# Patient Record
Sex: FEMALE | Race: WHITE | NOT HISPANIC OR LATINO | URBAN - METROPOLITAN AREA
[De-identification: names, ages, dates, MRNs, and addresses within clinical notes are randomized per-mention and may not be internally consistent; named-entity substitution may affect disease eponyms.]

---

## 2022-12-12 ENCOUNTER — TELEPHONE ENCOUNTER (OUTPATIENT)
Dept: URBAN - METROPOLITAN AREA CLINIC 40 | Facility: CLINIC | Age: 77
End: 2022-12-12

## 2022-12-14 ENCOUNTER — WEB ENCOUNTER (OUTPATIENT)
Dept: URBAN - METROPOLITAN AREA CLINIC 40 | Facility: CLINIC | Age: 77
End: 2022-12-14

## 2022-12-14 ENCOUNTER — OFFICE VISIT (OUTPATIENT)
Dept: URBAN - METROPOLITAN AREA CLINIC 40 | Facility: CLINIC | Age: 77
End: 2022-12-14
Payer: MEDICARE

## 2022-12-14 VITALS
DIASTOLIC BLOOD PRESSURE: 86 MMHG | HEIGHT: 67 IN | WEIGHT: 201 LBS | HEART RATE: 85 BPM | SYSTOLIC BLOOD PRESSURE: 142 MMHG | BODY MASS INDEX: 31.55 KG/M2

## 2022-12-14 DIAGNOSIS — R13.10 DYSPHAGIA: ICD-10-CM

## 2022-12-14 DIAGNOSIS — F41.9 ANXIETY: ICD-10-CM

## 2022-12-14 DIAGNOSIS — R12 HEARTBURN: ICD-10-CM

## 2022-12-14 DIAGNOSIS — Z90.49 HISTORY OF CHOLECYSTECTOMY: ICD-10-CM

## 2022-12-14 DIAGNOSIS — R10.84 GENERALIZED ABDOMINAL PAIN: ICD-10-CM

## 2022-12-14 DIAGNOSIS — R11.0 CHRONIC NAUSEA: ICD-10-CM

## 2022-12-14 PROCEDURE — 99204 OFFICE O/P NEW MOD 45 MIN: CPT | Performed by: PHYSICIAN ASSISTANT

## 2022-12-14 RX ORDER — SUCRALFATE 1 G/1
1 TABLET ON AN EMPTY STOMACH TABLET ORAL TWICE A DAY
Qty: 60 TABLET | Refills: 0 | OUTPATIENT

## 2022-12-14 RX ORDER — ONDANSETRON 4 MG/1
1 TABLET ON THE TONGUE AND ALLOW TO DISSOLVE TABLET, ORALLY DISINTEGRATING ORAL ONCE A DAY
Status: ACTIVE | COMMUNITY

## 2022-12-14 RX ORDER — HYDROCHLOROTHIAZIDE 12.5 MG/1
1 TABLET IN THE MORNING TABLET ORAL ONCE A DAY
Status: ACTIVE | COMMUNITY

## 2022-12-14 RX ORDER — PANTOPRAZOLE SODIUM 40 MG/1
1 TABLET TABLET, DELAYED RELEASE ORAL TWICE A DAY
Qty: 60 TABLET | Refills: 2

## 2022-12-14 RX ORDER — PANTOPRAZOLE SODIUM 40 MG/1
1 TABLET TABLET, DELAYED RELEASE ORAL ONCE A DAY
Status: ACTIVE | COMMUNITY

## 2022-12-14 NOTE — HPI-TODAY'S VISIT:
Ms Coe is a 77-year-old White female who presents to the office today for evaluation of abdominal pain.  She was seen for same complaint back on July 25, 2016 with me and Dr. Ramirez.  She was complaining of mild intermittent abdominal pain which had seemed to improve.  She has a history of cholecystectomy remotely.  History of diverticulosis and nonbleeding internal hemorrhoids.  Reportedly normal colonoscopy in 2013.  I do see notes where she was referred to our office in 2019 for evaluation of blood in stool, but patient did not make appointment to be seen.  No history of IBD or colon cancer.  History of anxiety, chronic kidney disease, hypertension,  diabetes. She is having general abdominal pain, including epigastric, periumbilical, sometimes lower abdominal pain.  She states that nothing makes it better.  She has tried Mylanta and is compliant with once daily pantoprazole.  Denies any use of NSAIDs or blood thinners.  No history of cardiac events.  Denies any significant chest pain at rest, shortness of breath.  Admits that she has panic attacks and anxiety and has been compliant with a half of a 0.25 of Xanax as given to her.  No rectal bleeding or melena.  Has occasional yellow stool after history of cholecystectomy.  States that her stools smaller in caliber and mushy but does passed once daily.  She is not wanting to take stool softener she believes it makes it Bala and has not had a high-fiber diet.  Drinks very little water.  Does sip coffee which seems to help her bowels move.  She does not believe she can drink very much liquid/volume and is declining colonoscopy.  However, she is complaining of intermittent solid food and pill dysphagia, worse in the last 1-2 months.  Rare ventricular and retrosternal area but states that this improves when she has voided her bladder.  No significant weight loss.  She does live by herself in a senior living facility. Additional complaints of fatigue.

## 2022-12-20 ENCOUNTER — TELEPHONE ENCOUNTER (OUTPATIENT)
Dept: URBAN - METROPOLITAN AREA CLINIC 40 | Facility: CLINIC | Age: 77
End: 2022-12-20

## 2022-12-21 ENCOUNTER — CLAIMS CREATED FROM THE CLAIM WINDOW (OUTPATIENT)
Dept: URBAN - METROPOLITAN AREA MEDICAL CENTER 9 | Facility: MEDICAL CENTER | Age: 77
End: 2022-12-21
Payer: MEDICARE

## 2022-12-21 DIAGNOSIS — R10.817 GENERALIZED ABDOMINAL TENDERNESS: ICD-10-CM

## 2022-12-21 DIAGNOSIS — R11.2 NAUSEA WITH VOMITING, UNSPECIFIED: ICD-10-CM

## 2022-12-21 DIAGNOSIS — K57.10 DIVERTICULOSIS OF SMALL INTESTINE WITHOUT PERFORATION OR ABSCESS WITHOUT BLEEDING: ICD-10-CM

## 2022-12-21 DIAGNOSIS — R13.12 DYSPHAGIA, OROPHARYNGEAL PHASE: ICD-10-CM

## 2022-12-21 DIAGNOSIS — R10.13 ABDOMINAL DISCOMFORT, EPIGASTRIC: ICD-10-CM

## 2022-12-21 DIAGNOSIS — K31.89 ACQUIRED DEFORMITY OF DUODENUM: ICD-10-CM

## 2022-12-21 DIAGNOSIS — R19.7 ACUTE DIARRHEA: ICD-10-CM

## 2022-12-21 DIAGNOSIS — R13.12 DYSPHAGIA: ICD-10-CM

## 2022-12-21 PROCEDURE — 99254 IP/OBS CNSLTJ NEW/EST MOD 60: CPT | Performed by: PHYSICIAN ASSISTANT

## 2022-12-21 PROCEDURE — 43450 DILATE ESOPHAGUS 1/MULT PASS: CPT | Performed by: INTERNAL MEDICINE

## 2022-12-21 PROCEDURE — 43245 EGD DILATE STRICTURE: CPT | Performed by: INTERNAL MEDICINE

## 2022-12-21 PROCEDURE — G8427 DOCREV CUR MEDS BY ELIG CLIN: HCPCS | Performed by: PHYSICIAN ASSISTANT

## 2022-12-21 PROCEDURE — 99222 1ST HOSP IP/OBS MODERATE 55: CPT | Performed by: PHYSICIAN ASSISTANT

## 2022-12-21 PROCEDURE — 43239 EGD BIOPSY SINGLE/MULTIPLE: CPT | Performed by: INTERNAL MEDICINE

## 2022-12-22 ENCOUNTER — CLAIMS CREATED FROM THE CLAIM WINDOW (OUTPATIENT)
Dept: URBAN - METROPOLITAN AREA MEDICAL CENTER 9 | Facility: MEDICAL CENTER | Age: 77
End: 2022-12-22
Payer: MEDICARE

## 2022-12-22 DIAGNOSIS — R10.817 GENERALIZED ABDOMINAL TENDERNESS: ICD-10-CM

## 2022-12-22 DIAGNOSIS — R13.12 DYSPHAGIA, OROPHARYNGEAL PHASE: ICD-10-CM

## 2022-12-22 DIAGNOSIS — K57.10 DIVERTICULOSIS OF SMALL INTESTINE WITHOUT PERFORATION OR ABSCESS WITHOUT BLEEDING: ICD-10-CM

## 2022-12-22 DIAGNOSIS — R19.7 ACUTE DIARRHEA: ICD-10-CM

## 2022-12-22 DIAGNOSIS — R11.2 NAUSEA WITH VOMITING, UNSPECIFIED: ICD-10-CM

## 2022-12-22 PROCEDURE — 43245 EGD DILATE STRICTURE: CPT | Performed by: PHYSICIAN ASSISTANT

## 2022-12-22 PROCEDURE — 99254 IP/OBS CNSLTJ NEW/EST MOD 60: CPT | Performed by: PHYSICIAN ASSISTANT

## 2022-12-22 PROCEDURE — 99232 SBSQ HOSP IP/OBS MODERATE 35: CPT | Performed by: PHYSICIAN ASSISTANT

## 2022-12-23 ENCOUNTER — CLAIMS CREATED FROM THE CLAIM WINDOW (OUTPATIENT)
Dept: URBAN - METROPOLITAN AREA MEDICAL CENTER 9 | Facility: MEDICAL CENTER | Age: 77
End: 2022-12-23
Payer: MEDICARE

## 2022-12-23 DIAGNOSIS — R13.12 DYSPHAGIA, OROPHARYNGEAL PHASE: ICD-10-CM

## 2022-12-23 DIAGNOSIS — K57.10 DIVERTICULOSIS OF SMALL INTESTINE WITHOUT PERFORATION OR ABSCESS WITHOUT BLEEDING: ICD-10-CM

## 2022-12-23 DIAGNOSIS — R11.2 NAUSEA WITH VOMITING, UNSPECIFIED: ICD-10-CM

## 2022-12-23 DIAGNOSIS — R19.7 ACUTE DIARRHEA: ICD-10-CM

## 2022-12-23 DIAGNOSIS — R10.817 GENERALIZED ABDOMINAL TENDERNESS: ICD-10-CM

## 2022-12-23 PROCEDURE — 43245 EGD DILATE STRICTURE: CPT | Performed by: PHYSICIAN ASSISTANT

## 2022-12-23 PROCEDURE — 99232 SBSQ HOSP IP/OBS MODERATE 35: CPT | Performed by: PHYSICIAN ASSISTANT

## 2022-12-23 PROCEDURE — 99254 IP/OBS CNSLTJ NEW/EST MOD 60: CPT | Performed by: PHYSICIAN ASSISTANT

## 2022-12-29 ENCOUNTER — TELEPHONE ENCOUNTER (OUTPATIENT)
Dept: URBAN - METROPOLITAN AREA CLINIC 40 | Facility: CLINIC | Age: 77
End: 2022-12-29

## 2022-12-30 ENCOUNTER — OFFICE VISIT (OUTPATIENT)
Dept: URBAN - METROPOLITAN AREA CLINIC 40 | Facility: CLINIC | Age: 77
End: 2022-12-30
Payer: MEDICARE

## 2022-12-30 ENCOUNTER — LAB OUTSIDE AN ENCOUNTER (OUTPATIENT)
Dept: URBAN - METROPOLITAN AREA CLINIC 40 | Facility: CLINIC | Age: 77
End: 2022-12-30

## 2022-12-30 VITALS
HEART RATE: 77 BPM | SYSTOLIC BLOOD PRESSURE: 140 MMHG | BODY MASS INDEX: 30.61 KG/M2 | DIASTOLIC BLOOD PRESSURE: 88 MMHG | WEIGHT: 195 LBS | HEIGHT: 67 IN | TEMPERATURE: 97 F

## 2022-12-30 DIAGNOSIS — R14.0 BLOATING: ICD-10-CM

## 2022-12-30 DIAGNOSIS — R13.10 DYSPHAGIA: ICD-10-CM

## 2022-12-30 DIAGNOSIS — R12 HEARTBURN: ICD-10-CM

## 2022-12-30 DIAGNOSIS — R11.0 CHRONIC NAUSEA: ICD-10-CM

## 2022-12-30 DIAGNOSIS — R10.84 GENERALIZED ABDOMINAL PAIN: ICD-10-CM

## 2022-12-30 DIAGNOSIS — F41.9 ANXIETY: ICD-10-CM

## 2022-12-30 DIAGNOSIS — E86.0 DEHYDRATION: ICD-10-CM

## 2022-12-30 DIAGNOSIS — R11.0 NAUSEA: ICD-10-CM

## 2022-12-30 DIAGNOSIS — Z90.49 HISTORY OF CHOLECYSTECTOMY: ICD-10-CM

## 2022-12-30 PROCEDURE — 99214 OFFICE O/P EST MOD 30 MIN: CPT | Performed by: INTERNAL MEDICINE

## 2022-12-30 RX ORDER — ONDANSETRON 4 MG/1
1 TABLET ON THE TONGUE AND ALLOW TO DISSOLVE TABLET, ORALLY DISINTEGRATING ORAL ONCE A DAY
Status: ACTIVE | COMMUNITY

## 2022-12-30 RX ORDER — AMITRIPTYLINE HYDROCHLORIDE 10 MG/1
1 TABLET AT BEDTIME TABLET, FILM COATED ORAL ONCE A DAY
Qty: 90 TABLET | Refills: 3 | OUTPATIENT
Start: 2022-12-30

## 2022-12-30 RX ORDER — DICYCLOMINE HYDROCHLORIDE 10 MG/1
2 CAPSULES CAPSULE ORAL THREE TIMES A DAY
Status: ACTIVE | COMMUNITY

## 2022-12-30 RX ORDER — CEFDINIR 300 MG/1
AS DIRECTED CAPSULE ORAL
Status: ACTIVE | COMMUNITY

## 2022-12-30 RX ORDER — HYDROCHLOROTHIAZIDE 12.5 MG/1
1 TABLET IN THE MORNING TABLET ORAL ONCE A DAY
Status: ACTIVE | COMMUNITY

## 2022-12-30 RX ORDER — SUCRALFATE 1 G/1
1 TABLET ON AN EMPTY STOMACH TABLET ORAL TWICE A DAY
Qty: 60 TABLET | Refills: 0 | Status: ACTIVE | COMMUNITY

## 2022-12-30 RX ORDER — PANTOPRAZOLE SODIUM 40 MG/1
1 TABLET TABLET, DELAYED RELEASE ORAL TWICE A DAY
Qty: 60 TABLET | Refills: 2 | Status: ACTIVE | COMMUNITY

## 2022-12-30 NOTE — HPI-TODAY'S VISIT:
Ms Ceo is a 77-year-old White female who presents to the office today for evaluation of abdominal pain.   She is new to me. I saw her in hospital last week for EGD, prior seen by Dr. Gallagher and Dr. Ramirez. EGD, small hiatal hernia, no stricture, bx stomach negative, dilated to 54Fr, emperic dilation for dysphagia. She saul to ER 12/20 for epi pain, CT and labs normal with normal cbc/cmp. See below. Taking PPI/Carafate.  She was seen for same complaint back on July 25, 2016 with me and Dr. Ramirez.  She was complaining of mild intermittent abdominal pain which had seemed to improve.  She has a history of cholecystectomy remotely.  History of diverticulosis and nonbleeding internal hemorrhoids.  Reportedly normal colonoscopy in 2013.  I do see notes where she was referred to our office in 2019 for evaluation of blood in stool, but patient did not make appointment to be seen.  No history of IBD or colon cancer.  History of anxiety, chronic kidney disease, hypertension,  diabetes. She is having general abdominal pain, including epigastric, periumbilical, sometimes lower abdominal pain.  She states that nothing makes it better.   ====== EXAM:  DH CT ABDOMEN/PELVIS W/O IV CONTRAST   CLINICAL INDICATION:  Nausea/vomiting Abdominal pain, acute, nonlocalized dysphagia Abdominal pain.   TECHNIQUE:  CT scan of the abdomen and pelvis was performed with multiplanar reformatted images generated from the data set without IV contrast. Dose reduction techniques were utilized.    COMPARISON:  CT abdomen and pelvis dated 8/3/2016.   FINDINGS:   The lack of intravenous contrast limits evaluation of the viscera.   Lower chest: The lung bases are clear. The heart is normal in size.   Liver: Normal in size and configuration. No suspicious mass.   Bile ducts: Stable mild intrahepatic and extrahepatic bile duct dilation, most likely due to the post cholecystectomy state.   Gallbladder: Surgically absent.    Pancreas: Normal. No main pancreatic duct dilation.   Spleen: Normal.   Adrenals: Normal.   Kidneys: No renal calculi. No hydroureteronephrosis. No contour deforming renal mass.   Bladder: Normal.   Reproductive organs: Post hysterectomy.   Bowel: No disproportionate dilation of the small or large bowel. Sigmoid diverticulosis without findings of acute diverticulitis. The appendix is normal in appearance.   Peritoneum/retroperitoneum: No fluid collection or ascites.   Lymph nodes: No enlarged abdominal or pelvic lymph nodes.    Vasculature: No aneurysmal dilation of the major abdominopelvic arteries.   Abdominal/pelvic wall: Ventral wall repair changes.   Bones: No destructive osseous lesions.   IMPRESSION: .         .   No acute findings. Sigmoid diverticulosis without diverticulitis.   Released By: MAIDA TAVARES MD 12/20/2022 6:01 PM

## 2023-01-12 ENCOUNTER — P2P PATIENT RECORD (OUTPATIENT)
Age: 78
End: 2023-01-12

## 2023-01-13 ENCOUNTER — TELEPHONE ENCOUNTER (OUTPATIENT)
Dept: URBAN - METROPOLITAN AREA CLINIC 74 | Facility: CLINIC | Age: 78
End: 2023-01-13

## 2023-01-20 ENCOUNTER — OFFICE VISIT (OUTPATIENT)
Dept: URBAN - METROPOLITAN AREA CLINIC 40 | Facility: CLINIC | Age: 78
End: 2023-01-20
Payer: MEDICARE

## 2023-01-20 VITALS
WEIGHT: 198.6 LBS | TEMPERATURE: 95.5 F | HEIGHT: 67 IN | SYSTOLIC BLOOD PRESSURE: 144 MMHG | HEART RATE: 76 BPM | DIASTOLIC BLOOD PRESSURE: 82 MMHG | BODY MASS INDEX: 31.17 KG/M2

## 2023-01-20 DIAGNOSIS — E86.0 DEHYDRATION: ICD-10-CM

## 2023-01-20 DIAGNOSIS — R10.84 GENERALIZED ABDOMINAL PAIN: ICD-10-CM

## 2023-01-20 DIAGNOSIS — F41.9 ANXIETY: ICD-10-CM

## 2023-01-20 DIAGNOSIS — K58.1 IRRITABLE BOWEL SYNDROME WITH CONSTIPATION: ICD-10-CM

## 2023-01-20 DIAGNOSIS — R14.0 BLOATING: ICD-10-CM

## 2023-01-20 DIAGNOSIS — R11.0 CHRONIC NAUSEA: ICD-10-CM

## 2023-01-20 DIAGNOSIS — R12 HEARTBURN: ICD-10-CM

## 2023-01-20 DIAGNOSIS — R13.10 DYSPHAGIA: ICD-10-CM

## 2023-01-20 DIAGNOSIS — Z90.49 HISTORY OF CHOLECYSTECTOMY: ICD-10-CM

## 2023-01-20 PROBLEM — 48694002 ANXIETY: Status: ACTIVE | Noted: 2022-12-14

## 2023-01-20 PROBLEM — 40739000 DYSPHAGIA: Status: ACTIVE | Noted: 2022-12-14

## 2023-01-20 PROBLEM — 440630006: Status: ACTIVE | Noted: 2023-01-20

## 2023-01-20 PROBLEM — 428882003 HISTORY OF CHOLECYSTECTOMY: Status: ACTIVE | Noted: 2022-12-14

## 2023-01-20 PROCEDURE — 99214 OFFICE O/P EST MOD 30 MIN: CPT | Performed by: INTERNAL MEDICINE

## 2023-01-20 RX ORDER — SUCRALFATE 1 G/1
1 TABLET ON AN EMPTY STOMACH TABLET ORAL TWICE A DAY
Qty: 60 TABLET | Refills: 0 | Status: ACTIVE | COMMUNITY

## 2023-01-20 RX ORDER — CEFDINIR 300 MG/1
AS DIRECTED CAPSULE ORAL
Status: ACTIVE | COMMUNITY

## 2023-01-20 RX ORDER — AMITRIPTYLINE HYDROCHLORIDE 10 MG/1
1 TABLET AT BEDTIME TABLET, FILM COATED ORAL ONCE A DAY
Qty: 90 TABLET | Refills: 3 | Status: ON HOLD | COMMUNITY
Start: 2022-12-30

## 2023-01-20 RX ORDER — LOSARTAN POTASSIUM 25 MG/1
1 TABLET TABLET ORAL ONCE A DAY
Status: ACTIVE | COMMUNITY

## 2023-01-20 RX ORDER — ONDANSETRON 4 MG/1
1 TABLET ON THE TONGUE AND ALLOW TO DISSOLVE TABLET, ORALLY DISINTEGRATING ORAL ONCE A DAY
Status: ACTIVE | COMMUNITY

## 2023-01-20 RX ORDER — PANTOPRAZOLE SODIUM 40 MG/1
1 TABLET TABLET, DELAYED RELEASE ORAL ONCE A DAY
Qty: 90 TABLET | Refills: 3

## 2023-01-20 RX ORDER — SUCRALFATE 1 G/1
1 TABLET ON AN EMPTY STOMACH TABLET ORAL TWICE A DAY
OUTPATIENT

## 2023-01-20 RX ORDER — HYDROCHLOROTHIAZIDE 12.5 MG/1
1 TABLET IN THE MORNING TABLET ORAL ONCE A DAY
Status: ON HOLD | COMMUNITY

## 2023-01-20 RX ORDER — DICYCLOMINE HYDROCHLORIDE 10 MG/1
2 CAPSULES CAPSULE ORAL THREE TIMES A DAY
Status: ACTIVE | COMMUNITY

## 2023-01-20 RX ORDER — PANTOPRAZOLE SODIUM 40 MG/1
1 TABLET TABLET, DELAYED RELEASE ORAL TWICE A DAY
Qty: 60 TABLET | Refills: 2 | Status: ACTIVE | COMMUNITY

## 2023-01-20 NOTE — HPI-TODAY'S VISIT:
Ms Coe is a 77-year-old White female who presents to the office today for evaluation of dysphagia and dyspepsia. Recent EGD negative for stricture, small HH noted, dilated emperically, bx negative. Dyphagia peristed. UGI showed presbyesophagus, barium tablet stalled at the GEJ, stricture in the DDx, but none seen at EGD. I saw her in hospital last month for EGD, prior seen by Dr. Gallagher and Dr. Ramirez. EGD, small hiatal hernia, no stricture, bx stomach negative, dilated to 54Fr, emperic dilation for dysphagia. She saul to ER 12/20 for epi pain, CT and labs normal with normal cbc/cmp. See below. Taking PPI/Carafate.  She was seen for same complaint back on July 25, 2016 with me and Dr. Ramirez.  She was complaining of mild intermittent abdominal pain which had seemed to improve.  She has a history of cholecystectomy remotely.  History of diverticulosis and nonbleeding internal hemorrhoids.  Reportedly normal colonoscopy in 2013.  I do see notes where she was referred to our office in 2019 for evaluation of blood in stool, but patient did not make appointment to be seen.  No history of IBD or colon cancer.  History of anxiety, chronic kidney disease, hypertension,  diabetes. She is having general abdominal pain, including epigastric, periumbilical, sometimes lower abdominal pain.  She states that nothing makes it better.

## 2023-01-23 ENCOUNTER — OFFICE VISIT (OUTPATIENT)
Dept: URBAN - METROPOLITAN AREA SURGERY CENTER 30 | Facility: SURGERY CENTER | Age: 78
End: 2023-01-23

## 2023-02-22 ENCOUNTER — OFFICE VISIT (OUTPATIENT)
Dept: URBAN - METROPOLITAN AREA CLINIC 40 | Facility: CLINIC | Age: 78
End: 2023-02-22

## 2023-03-28 ENCOUNTER — OFFICE VISIT (OUTPATIENT)
Dept: URBAN - METROPOLITAN AREA CLINIC 40 | Facility: CLINIC | Age: 78
End: 2023-03-28

## 2023-07-20 ENCOUNTER — OFFICE VISIT (OUTPATIENT)
Dept: URBAN - METROPOLITAN AREA CLINIC 40 | Facility: CLINIC | Age: 78
End: 2023-07-20

## 2023-07-21 ENCOUNTER — OFFICE VISIT (OUTPATIENT)
Dept: URBAN - METROPOLITAN AREA CLINIC 40 | Facility: CLINIC | Age: 78
End: 2023-07-21
Payer: MEDICARE

## 2023-07-21 VITALS
DIASTOLIC BLOOD PRESSURE: 76 MMHG | HEIGHT: 67 IN | BODY MASS INDEX: 30.35 KG/M2 | HEART RATE: 72 BPM | SYSTOLIC BLOOD PRESSURE: 128 MMHG | TEMPERATURE: 95.4 F | WEIGHT: 193.4 LBS

## 2023-07-21 DIAGNOSIS — Z90.49 HISTORY OF CHOLECYSTECTOMY: ICD-10-CM

## 2023-07-21 DIAGNOSIS — R11.0 CHRONIC NAUSEA: ICD-10-CM

## 2023-07-21 DIAGNOSIS — K58.1 IRRITABLE BOWEL SYNDROME WITH CONSTIPATION: ICD-10-CM

## 2023-07-21 DIAGNOSIS — R14.0 BLOATING: ICD-10-CM

## 2023-07-21 DIAGNOSIS — F41.9 ANXIETY: ICD-10-CM

## 2023-07-21 DIAGNOSIS — R10.84 GENERALIZED ABDOMINAL PAIN: ICD-10-CM

## 2023-07-21 DIAGNOSIS — E86.0 DEHYDRATION: ICD-10-CM

## 2023-07-21 DIAGNOSIS — R12 HEARTBURN: ICD-10-CM

## 2023-07-21 DIAGNOSIS — R13.10 DYSPHAGIA: ICD-10-CM

## 2023-07-21 PROCEDURE — 99213 OFFICE O/P EST LOW 20 MIN: CPT | Performed by: INTERNAL MEDICINE

## 2023-07-21 RX ORDER — DICYCLOMINE HYDROCHLORIDE 10 MG/1
2 CAPSULES CAPSULE ORAL THREE TIMES A DAY
Status: ON HOLD | COMMUNITY

## 2023-07-21 RX ORDER — AMITRIPTYLINE HYDROCHLORIDE 10 MG/1
1 TABLET AT BEDTIME TABLET, FILM COATED ORAL ONCE A DAY
Qty: 90 TABLET | Refills: 3 | Status: ON HOLD | COMMUNITY
Start: 2022-12-30

## 2023-07-21 RX ORDER — HYDROCHLOROTHIAZIDE 12.5 MG/1
1 TABLET IN THE MORNING TABLET ORAL ONCE A DAY
Status: ON HOLD | COMMUNITY

## 2023-07-21 RX ORDER — LOSARTAN POTASSIUM 25 MG/1
1 TABLET TABLET ORAL ONCE A DAY
Status: ACTIVE | COMMUNITY

## 2023-07-21 RX ORDER — PANTOPRAZOLE SODIUM 40 MG/1
1 TABLET TABLET, DELAYED RELEASE ORAL ONCE A DAY
Qty: 90 TABLET | Refills: 3 | Status: ON HOLD | COMMUNITY

## 2023-07-21 RX ORDER — ONDANSETRON 4 MG/1
1 TABLET ON THE TONGUE AND ALLOW TO DISSOLVE TABLET, ORALLY DISINTEGRATING ORAL ONCE A DAY
Status: ON HOLD | COMMUNITY

## 2023-07-21 RX ORDER — CEFDINIR 300 MG/1
AS DIRECTED CAPSULE ORAL
Status: ON HOLD | COMMUNITY

## 2023-07-21 NOTE — HPI-TODAY'S VISIT:
Follow up. Prior note assesment/plan: Ongoing anxiety, multiple c/o, nausea, bloating, cramping, dyspepsia and nonspecific dysphagia.  I suspect functional symptoms. Almost certainly IBS-C. Overall she is, "much much better" in her words. No dysphagia now and IBS and cramping resolved. She d/c TCA/Amitriptyine. Xifaxan helped greatly (samples).  UGI showed presbyesophagus, barium tablet stalled at the GEJ, stricture in the DDx, but none seen at EGD. Negative labs. Negative CT abdomen pelvis without contrast.  Negative ER visit last week. Negative EGD with esophageal, gastric, duodenal biopsies and dilation to 54Fr emperically for dysphagia.  Plan:   Xifaxan tid for 6 days free samples again today Continue protonix D/C Carafate Gaviscon prn IBS discussion and education with pts sister FODMAP Dicyclomine tid prn MOM vs Miralax for costipation ===== Ms Coe is a 77-year-old White female who presents to the office today for evaluation of dysphagia and dyspepsia. Recent EGD negative for stricture, small HH noted, dilated emperically, bx negative. Dyphagia peristed. UGI showed presbyesophagus, barium tablet stalled at the GEJ, stricture in the DDx, but none seen at EGD. I saw her in hospital last month for EGD, prior seen by Dr. Gallagher and Dr. Ramirez. EGD, small hiatal hernia, no stricture, bx stomach negative, dilated to 54Fr, emperic dilation for dysphagia. She saul to ER 12/20 for epi pain, CT and labs normal with normal cbc/cmp. See below. Taking PPI/Carafate.   She was seen for same complaint back on July 25, 2016 with me and Dr. Ramirez.  She was complaining of mild intermittent abdominal pain which had seemed to improve.  She has a history of cholecystectomy remotely.  History of diverticulosis and nonbleeding internal hemorrhoids.  Reportedly normal colonoscopy in 2013.  I do see notes where she was referred to our office in 2019 for evaluation of blood in stool, but patient did not make appointment to be seen.  No history of IBD or colon cancer.  History of anxiety, chronic kidney disease, hypertension,  diabetes. She is having general abdominal pain, including epigastric, periumbilical, sometimes lower abdominal pain.  She states that nothing makes it better.

## 2023-09-05 ENCOUNTER — TELEPHONE ENCOUNTER (OUTPATIENT)
Dept: URBAN - METROPOLITAN AREA CLINIC 74 | Facility: CLINIC | Age: 78
End: 2023-09-05

## 2023-09-05 ENCOUNTER — TELEPHONE ENCOUNTER (OUTPATIENT)
Dept: URBAN - METROPOLITAN AREA CLINIC 40 | Facility: CLINIC | Age: 78
End: 2023-09-05

## 2023-09-05 RX ORDER — PANTOPRAZOLE SODIUM 40 MG/1
1 TABLET TABLET, DELAYED RELEASE ORAL ONCE A DAY
Qty: 90 TABLET | Refills: 3

## 2023-09-05 RX ORDER — RIFAXIMIN 550 MG/1
1 TABLET TABLET ORAL THREE TIMES A DAY
Qty: 42 TABLET | Refills: 2 | OUTPATIENT
Start: 2023-09-05 | End: 2023-10-17

## 2023-09-07 ENCOUNTER — TELEPHONE ENCOUNTER (OUTPATIENT)
Dept: URBAN - METROPOLITAN AREA CLINIC 73 | Facility: CLINIC | Age: 78
End: 2023-09-07

## 2023-10-10 ENCOUNTER — TELEPHONE ENCOUNTER (OUTPATIENT)
Dept: URBAN - METROPOLITAN AREA CLINIC 40 | Facility: CLINIC | Age: 78
End: 2023-10-10

## 2023-10-10 RX ORDER — PANTOPRAZOLE SODIUM 40 MG/1
1 TABLET TABLET, DELAYED RELEASE ORAL ONCE A DAY
Qty: 90 TABLET | Refills: 3 | OUTPATIENT
Start: 2023-10-10

## 2023-10-10 RX ORDER — FAMOTIDINE 40 MG/1
1 TABLET AT BEDTIME TABLET, FILM COATED ORAL ONCE A DAY
Qty: 90 TABLET | Refills: 3 | OUTPATIENT
Start: 2023-10-10

## 2023-11-10 ENCOUNTER — OFFICE VISIT (OUTPATIENT)
Dept: URBAN - METROPOLITAN AREA CLINIC 40 | Facility: CLINIC | Age: 78
End: 2023-11-10

## 2023-11-10 RX ORDER — ONDANSETRON 4 MG/1
1 TABLET ON THE TONGUE AND ALLOW TO DISSOLVE TABLET, ORALLY DISINTEGRATING ORAL ONCE A DAY
COMMUNITY

## 2023-11-10 RX ORDER — CEFDINIR 300 MG/1
AS DIRECTED CAPSULE ORAL
COMMUNITY

## 2023-11-10 RX ORDER — PANTOPRAZOLE SODIUM 40 MG/1
1 TABLET TABLET, DELAYED RELEASE ORAL ONCE A DAY
Qty: 90 TABLET | Refills: 3 | COMMUNITY
Start: 2023-10-10

## 2023-11-10 RX ORDER — FAMOTIDINE 40 MG/1
1 TABLET AT BEDTIME TABLET, FILM COATED ORAL ONCE A DAY
Qty: 90 TABLET | Refills: 3 | COMMUNITY
Start: 2023-10-10

## 2023-11-10 RX ORDER — AMITRIPTYLINE HYDROCHLORIDE 10 MG/1
1 TABLET AT BEDTIME TABLET, FILM COATED ORAL ONCE A DAY
Qty: 90 TABLET | Refills: 3 | COMMUNITY
Start: 2022-12-30

## 2023-11-10 RX ORDER — LOSARTAN POTASSIUM 25 MG/1
1 TABLET TABLET ORAL ONCE A DAY
COMMUNITY

## 2023-11-10 RX ORDER — HYDROCHLOROTHIAZIDE 12.5 MG/1
1 TABLET IN THE MORNING TABLET ORAL ONCE A DAY
COMMUNITY

## 2023-11-10 RX ORDER — DICYCLOMINE HYDROCHLORIDE 10 MG/1
2 CAPSULES CAPSULE ORAL THREE TIMES A DAY
COMMUNITY

## 2023-11-10 RX ORDER — PANTOPRAZOLE SODIUM 40 MG/1
1 TABLET TABLET, DELAYED RELEASE ORAL ONCE A DAY
Qty: 90 TABLET | Refills: 3 | COMMUNITY

## 2024-02-16 ENCOUNTER — OV EP (OUTPATIENT)
Dept: URBAN - METROPOLITAN AREA CLINIC 40 | Facility: CLINIC | Age: 79
End: 2024-02-16
Payer: MEDICARE

## 2024-02-16 VITALS
SYSTOLIC BLOOD PRESSURE: 128 MMHG | HEIGHT: 67 IN | DIASTOLIC BLOOD PRESSURE: 84 MMHG | HEART RATE: 78 BPM | BODY MASS INDEX: 30.54 KG/M2 | WEIGHT: 194.6 LBS | TEMPERATURE: 97 F

## 2024-02-16 DIAGNOSIS — R12 HEARTBURN: ICD-10-CM

## 2024-02-16 DIAGNOSIS — R10.84 GENERALIZED ABDOMINAL PAIN: ICD-10-CM

## 2024-02-16 DIAGNOSIS — Z90.49 HISTORY OF CHOLECYSTECTOMY: ICD-10-CM

## 2024-02-16 DIAGNOSIS — Z87.19 HISTORY OF DIVERTICULITIS: ICD-10-CM

## 2024-02-16 DIAGNOSIS — K58.1 IRRITABLE BOWEL SYNDROME WITH CONSTIPATION: ICD-10-CM

## 2024-02-16 DIAGNOSIS — R14.0 BLOATING: ICD-10-CM

## 2024-02-16 DIAGNOSIS — F41.9 ANXIETY: ICD-10-CM

## 2024-02-16 DIAGNOSIS — R11.0 NAUSEA: ICD-10-CM

## 2024-02-16 PROCEDURE — 99214 OFFICE O/P EST MOD 30 MIN: CPT | Performed by: NURSE PRACTITIONER

## 2024-02-16 RX ORDER — FAMOTIDINE 40 MG/1
1 TABLET AT BEDTIME TABLET, FILM COATED ORAL ONCE A DAY
Qty: 90 TABLET | Refills: 3 | Status: ACTIVE | COMMUNITY
Start: 2023-10-10

## 2024-02-16 RX ORDER — LOSARTAN POTASSIUM 25 MG/1
1 TABLET TABLET ORAL ONCE A DAY
Status: ACTIVE | COMMUNITY

## 2024-02-16 RX ORDER — PANTOPRAZOLE SODIUM 40 MG/1
1 TABLET TABLET, DELAYED RELEASE ORAL ONCE A DAY
Qty: 90 TABLET | Refills: 3 | Status: ACTIVE | COMMUNITY
Start: 2023-10-10

## 2024-02-16 RX ORDER — ONDANSETRON 4 MG/1
1 TABLET ON THE TONGUE AND ALLOW TO DISSOLVE TABLET, ORALLY DISINTEGRATING ORAL ONCE A DAY
Status: ACTIVE | COMMUNITY

## 2024-02-16 RX ORDER — DICYCLOMINE HYDROCHLORIDE 10 MG/1
2 CAPSULES CAPSULE ORAL THREE TIMES A DAY
Qty: 180 | Refills: 1 | OUTPATIENT
Start: 2024-02-16 | End: 2024-04-16

## 2024-02-16 RX ORDER — PANTOPRAZOLE SODIUM 40 MG/1
1 TABLET TABLET, DELAYED RELEASE ORAL ONCE A DAY
Qty: 90 TABLET | Refills: 3 | Status: ACTIVE | COMMUNITY

## 2024-02-16 RX ORDER — AMOXICILLIN 500 MG/1
1 CAPSULE CAPSULE ORAL
Status: ACTIVE | COMMUNITY

## 2024-02-16 RX ORDER — ONDANSETRON 4 MG/1
1 TABLET ON THE TONGUE AND ALLOW TO DISSOLVE TABLET, ORALLY DISINTEGRATING ORAL ONCE A DAY
Qty: 10 | Refills: 0 | OUTPATIENT
Start: 2024-02-16

## 2024-02-16 RX ORDER — DICYCLOMINE HYDROCHLORIDE 10 MG/1
2 CAPSULES CAPSULE ORAL THREE TIMES A DAY
Status: ON HOLD | COMMUNITY

## 2024-02-16 RX ORDER — HYDROCHLOROTHIAZIDE 12.5 MG/1
1 TABLET IN THE MORNING TABLET ORAL ONCE A DAY
Status: ON HOLD | COMMUNITY

## 2024-02-16 RX ORDER — CEFDINIR 300 MG/1
AS DIRECTED CAPSULE ORAL
Status: ACTIVE | COMMUNITY

## 2024-02-16 RX ORDER — METRONIDAZOLE 500 MG/1
1 TABLET TABLET ORAL THREE TIMES A DAY
Qty: 30 | Refills: 0 | OUTPATIENT
Start: 2024-02-16 | End: 2024-02-26

## 2024-02-16 RX ORDER — AMITRIPTYLINE HYDROCHLORIDE 10 MG/1
1 TABLET AT BEDTIME TABLET, FILM COATED ORAL ONCE A DAY
Qty: 90 TABLET | Refills: 3 | Status: ON HOLD | COMMUNITY
Start: 2022-12-30

## 2024-02-16 NOTE — PHYSICAL EXAM GASTROINTESTINAL
Abdomen , soft, mildly tender to RLQ and LLQ,  nondistended , no guarding or rigidity , no masses palpable , normal bowel sounds , Liver and Spleen,  no hepatosplenomegaly , liver nontender

## 2024-02-16 NOTE — HPI-TODAY'S VISIT:
69 year old female patient with PMH as listed below. Known to Dr. Vergara, Sunitha Walker PA-C.    Last seen 07/21/23 by Dr. Vergara.  Ongoing anxiety, multiple c/o, nausea, bloating, cramping, dyspepsia and nonspecific dysphagia. He suspected functional symptoms. Almost certainly IBS-C. Overall she is, "much much better" in her words. No dysphagia now and IBS and cramping resolved.  She d/c TCA/Amitriptyine. Xifaxan helped greatly (samples). UGI showed presbyesophagus, barium tablet stalled at the GEJ, stricture in the DDx, but none seen at EGD. Negative labs. Negative CT abdomen pelvis without contrast. Negative ER visit last week. Negative EGD with esophageal, gastric, duodenal biopsies and dilation to 54Fr emperically for dysphagia.        Plan was:        Xifaxan tid for 6 days free samples again today        Continue protonix        D/C Carafate        Gaviscon prn        IBS discussion and education with pts sister        FODMAP        Dicyclomine tid prn        MOM vs Miralax for costipation Pt is 100% better in her words and not taking any GI medications now. She stopped Dicyclomine and uses Gaviscon only very rarely. SFD resolved. F/U 2 years or prn if s/s recur. EGD was 12/21/22 at [facility]. 1/10/23 BS upper [facility].  ---- The patient presents today with c/o recent episode of diverticulitis, self diagnosed. She has complaints of what she believes is a recent diverticulitis flare.  States she has had enough flares to where she knows what it is.  Started over 5 days ago and she began taking the 500 mg twice a day amoxicillin that her primary care provider had given her.  She states she had a flare in November was given some then and now is taking the refill on hand that was given in case she had another flare.  She feels better today with abdominal pain, still has complaints of occasional constipation but she did have a stool this morning and is passing gas.  Gave herself a enema yesterday.  Advised to continue taking her MiraLAX and milk of magnesia.  Will refill her dicyclomine and ondansetron.  Will start her on a course of Flagyl as uncertain how much amoxicillin she actually has.  Abdomen does not appear to be acute so we will hold on imaging for now. Patient's symptoms have improved.  Advised if pain returns or worsens to go to ED and/or call the on-call physician for order for CT scan.

## 2024-02-16 NOTE — PHYSICAL EXAM CONSTITUTIONAL:
well developed, well nourished , in no acute distress , ambulating with cane , normal communication ability, elderly

## 2024-03-18 ENCOUNTER — OV EP (OUTPATIENT)
Dept: URBAN - METROPOLITAN AREA CLINIC 40 | Facility: CLINIC | Age: 79
End: 2024-03-18
Payer: MEDICARE

## 2024-03-18 ENCOUNTER — LAB (OUTPATIENT)
Dept: URBAN - METROPOLITAN AREA CLINIC 40 | Facility: CLINIC | Age: 79
End: 2024-03-18

## 2024-03-18 VITALS
TEMPERATURE: 97.4 F | SYSTOLIC BLOOD PRESSURE: 142 MMHG | HEART RATE: 80 BPM | HEIGHT: 67 IN | WEIGHT: 193.6 LBS | BODY MASS INDEX: 30.39 KG/M2 | DIASTOLIC BLOOD PRESSURE: 88 MMHG

## 2024-03-18 DIAGNOSIS — R12 HEARTBURN: ICD-10-CM

## 2024-03-18 DIAGNOSIS — F41.9 ANXIETY: ICD-10-CM

## 2024-03-18 DIAGNOSIS — Z12.11 COLON CANCER SCREENING: ICD-10-CM

## 2024-03-18 DIAGNOSIS — R19.8 STRAINING WITH STOOLS: ICD-10-CM

## 2024-03-18 DIAGNOSIS — Z90.49 HISTORY OF CHOLECYSTECTOMY: ICD-10-CM

## 2024-03-18 DIAGNOSIS — R14.0 BLOATING: ICD-10-CM

## 2024-03-18 DIAGNOSIS — K58.1 IRRITABLE BOWEL SYNDROME WITH CONSTIPATION: ICD-10-CM

## 2024-03-18 DIAGNOSIS — Z87.19 HISTORY OF DIVERTICULITIS: ICD-10-CM

## 2024-03-18 DIAGNOSIS — R10.84 GENERALIZED ABDOMINAL PAIN: ICD-10-CM

## 2024-03-18 DIAGNOSIS — R11.0 NAUSEA: ICD-10-CM

## 2024-03-18 PROCEDURE — 99214 OFFICE O/P EST MOD 30 MIN: CPT | Performed by: INTERNAL MEDICINE

## 2024-03-18 RX ORDER — DICYCLOMINE HYDROCHLORIDE 10 MG/1
2 CAPSULES CAPSULE ORAL THREE TIMES A DAY
Qty: 180 | Refills: 1 | OUTPATIENT

## 2024-03-18 RX ORDER — LINACLOTIDE 290 UG/1
1 CAPSULE, GELATIN COATED ORAL ONCE A DAY
Qty: 90 | Refills: 3 | OUTPATIENT
Start: 2024-03-18 | End: 2025-03-13

## 2024-03-18 RX ORDER — LOSARTAN POTASSIUM 25 MG/1
1 TABLET TABLET ORAL ONCE A DAY
Status: ACTIVE | COMMUNITY

## 2024-03-18 RX ORDER — PANTOPRAZOLE SODIUM 40 MG/1
1 TABLET TABLET, DELAYED RELEASE ORAL ONCE A DAY
Qty: 90 TABLET | Refills: 3 | Status: ACTIVE | COMMUNITY
Start: 2023-10-10

## 2024-03-18 RX ORDER — AMOXICILLIN 500 MG/1
1 CAPSULE CAPSULE ORAL
Status: ACTIVE | COMMUNITY

## 2024-03-18 RX ORDER — CEFDINIR 300 MG/1
AS DIRECTED CAPSULE ORAL
Status: ACTIVE | COMMUNITY

## 2024-03-18 RX ORDER — ONDANSETRON 4 MG/1
1 TABLET ON THE TONGUE AND ALLOW TO DISSOLVE TABLET, ORALLY DISINTEGRATING ORAL ONCE A DAY
Qty: 10 | Refills: 0 | Status: ACTIVE | COMMUNITY
Start: 2024-02-16

## 2024-03-18 RX ORDER — FAMOTIDINE 40 MG/1
1 TABLET AT BEDTIME TABLET, FILM COATED ORAL ONCE A DAY
Qty: 90 TABLET | Refills: 3 | Status: ACTIVE | COMMUNITY
Start: 2023-10-10

## 2024-03-18 RX ORDER — AMITRIPTYLINE HYDROCHLORIDE 10 MG/1
1 TABLET AT BEDTIME TABLET, FILM COATED ORAL ONCE A DAY
Qty: 90 TABLET | Refills: 3 | Status: ACTIVE | COMMUNITY
Start: 2022-12-30

## 2024-03-18 RX ORDER — HYDROCHLOROTHIAZIDE 12.5 MG/1
1 TABLET IN THE MORNING TABLET ORAL ONCE A DAY
Status: ACTIVE | COMMUNITY

## 2024-03-18 RX ORDER — ONDANSETRON 4 MG/1
1 TABLET ON THE TONGUE AND ALLOW TO DISSOLVE TABLET, ORALLY DISINTEGRATING ORAL ONCE A DAY
Status: ACTIVE | COMMUNITY

## 2024-03-18 RX ORDER — DICYCLOMINE HYDROCHLORIDE 10 MG/1
2 CAPSULES CAPSULE ORAL THREE TIMES A DAY
Qty: 180 | Refills: 1 | Status: ACTIVE | COMMUNITY
Start: 2024-02-16 | End: 2024-04-16

## 2024-03-18 NOTE — HPI-TODAY'S VISIT:
Pt presents for evaluation of constipation. She is distressed about BMs. Straining to pass hard stools. Small pellets only. No bleeding. Sometimes many minutes of straining to have a small BM. No laxative use. Also, cramps and pain in lower abdomen, diffuse but mainly llq, relief with BMs. Last colonoscopy was years ago. Saw urology and a pelvic exam done and she was told her rectum was pushing on the vagina. She felt she had diverticulitis, but recent CT ruled this out.     ====== Prior note SC/NP: 69 year old female patient with PMH as listed below. Known to Dr. Vergara, Sunitha Walker PA-C.    Last seen 07/21/23 by Dr. Vergara.  Ongoing anxiety, multiple c/o, nausea, bloating, cramping, dyspepsia and nonspecific dysphagia. He suspected functional symptoms. Almost certainly IBS-C. Overall she is, "much much better" in her words. No dysphagia now and IBS and cramping resolved.  She d/c TCA/Amitriptyine. Xifaxan helped greatly (samples). UGI showed presbyesophagus, barium tablet stalled at the GEJ, stricture in the DDx, but none seen at EGD. Negative labs. Negative CT abdomen pelvis without contrast. Negative ER visit last week. Negative EGD with esophageal, gastric, duodenal biopsies and dilation to 54Fr emperically for dysphagia.        Plan was:        Xifaxan tid for 6 days free samples again today        Continue protonix        D/C Carafate        Gaviscon prn        IBS discussion and education with pts sister        FODMAP        Dicyclomine tid prn        MOM vs Miralax for costipation Pt is 100% better in her words and not taking any GI medications now. She stopped Dicyclomine and uses Gaviscon only very rarely. SFD resolved. F/U 2 years or prn if s/s recur. EGD was 12/21/22 at hospital. 1/10/23 BS upper GIS.  ---- The patient presents today with c/o recent episode of diverticulitis, self diagnosed. She has complaints of what she believes is a recent diverticulitis flare.  States she has had enough flares to where she knows what it is.  Started over 5 days ago and she began taking the 500 mg twice a day amoxicillin that her primary care provider had given her.  She states she had a flare in November was given some then and now is taking the refill on hand that was given in case she had another flare.  She feels better today with abdominal pain, still has complaints of occasional constipation but she did have a stool this morning and is passing gas.  Gave herself a enema yesterday.  Advised to continue taking her MiraLAX and milk of magnesia.  Will refill her dicyclomine and ondansetron.  Will start her on a course of Flagyl as uncertain how much amoxicillin she actually has.  Abdomen does not appear to be acute so we will hold on imaging for now. Patient's symptoms have improved.  Advised if pain returns or worsens to go to ED and/or call the on-call physician for order for CT scan.

## 2024-05-06 ENCOUNTER — TELEPHONE ENCOUNTER (OUTPATIENT)
Dept: URBAN - METROPOLITAN AREA CLINIC 40 | Facility: CLINIC | Age: 79
End: 2024-05-06

## 2024-05-14 ENCOUNTER — OFFICE VISIT (OUTPATIENT)
Dept: URBAN - METROPOLITAN AREA SURGERY CENTER 30 | Facility: SURGERY CENTER | Age: 79
End: 2024-05-14

## 2024-06-11 ENCOUNTER — DASHBOARD ENCOUNTERS (OUTPATIENT)
Age: 79
End: 2024-06-11

## 2024-06-11 ENCOUNTER — OFFICE VISIT (OUTPATIENT)
Dept: URBAN - METROPOLITAN AREA CLINIC 40 | Facility: CLINIC | Age: 79
End: 2024-06-11

## 2024-06-11 RX ORDER — ONDANSETRON 4 MG/1
1 TABLET ON THE TONGUE AND ALLOW TO DISSOLVE TABLET, ORALLY DISINTEGRATING ORAL ONCE A DAY
Status: ACTIVE | COMMUNITY

## 2024-06-11 RX ORDER — CEFDINIR 300 MG/1
AS DIRECTED CAPSULE ORAL
Status: ACTIVE | COMMUNITY

## 2024-06-11 RX ORDER — DICYCLOMINE HYDROCHLORIDE 10 MG/1
2 CAPSULES CAPSULE ORAL THREE TIMES A DAY
Qty: 180 | Refills: 1 | Status: ACTIVE | COMMUNITY

## 2024-06-11 RX ORDER — FAMOTIDINE 40 MG/1
1 TABLET AT BEDTIME TABLET, FILM COATED ORAL ONCE A DAY
Qty: 90 TABLET | Refills: 3 | Status: ACTIVE | COMMUNITY
Start: 2023-10-10

## 2024-06-11 RX ORDER — LINACLOTIDE 290 UG/1
1 CAPSULE, GELATIN COATED ORAL ONCE A DAY
Qty: 90 | Refills: 3 | Status: ACTIVE | COMMUNITY
Start: 2024-03-18 | End: 2025-03-13

## 2024-06-11 RX ORDER — PANTOPRAZOLE SODIUM 40 MG/1
1 TABLET TABLET, DELAYED RELEASE ORAL ONCE A DAY
Qty: 90 TABLET | Refills: 3 | Status: ACTIVE | COMMUNITY
Start: 2023-10-10

## 2024-06-11 RX ORDER — ONDANSETRON 4 MG/1
1 TABLET ON THE TONGUE AND ALLOW TO DISSOLVE TABLET, ORALLY DISINTEGRATING ORAL ONCE A DAY
Qty: 10 | Refills: 0 | Status: ACTIVE | COMMUNITY
Start: 2024-02-16

## 2024-06-11 RX ORDER — HYDROCHLOROTHIAZIDE 12.5 MG/1
1 TABLET IN THE MORNING TABLET ORAL ONCE A DAY
Status: ACTIVE | COMMUNITY

## 2024-06-11 RX ORDER — LOSARTAN POTASSIUM 25 MG/1
1 TABLET TABLET ORAL ONCE A DAY
Status: ACTIVE | COMMUNITY

## 2024-06-11 RX ORDER — AMOXICILLIN 500 MG/1
1 CAPSULE CAPSULE ORAL
Status: ACTIVE | COMMUNITY

## 2024-06-11 RX ORDER — AMITRIPTYLINE HYDROCHLORIDE 10 MG/1
1 TABLET AT BEDTIME TABLET, FILM COATED ORAL ONCE A DAY
Qty: 90 TABLET | Refills: 3 | Status: ACTIVE | COMMUNITY
Start: 2022-12-30

## 2024-06-11 NOTE — HPI-TODAY'S VISIT:
Mr. Coe is a 78 year old White female who returns to office for follow up. Last colonoscopy was years ago. Saw urology and a pelvic exam done and she was told her rectum was pushing on the vagina. She felt she had diverticulitis, but recent CT ruled this out.  Ongoing anxiety, multiple c/o, nausea, bloating, cramping, dyspepsia and nonspecific dysphagia. He suspected functional symptoms. Suspected. IBS-C. She d/c TCA/Amitriptyine. Xifaxan helped greatly (samples). UGI showed presbyesophagus, barium tablet stalled at the GEJ, stricture in the DDx, but none seen at EGD. Negative labs and CT abdomen pelvis without contrast. Negative EGD with esophageal, gastric, duodenal biopsies and dilation to 54Fr emperically for dysphagia. Low FODMAP diet, Xifaxan trial recommended. Last visit, patient was 100% better. She stopped Dicyclomine and uses Gaviscon only very rarely.  Patient had c/o recent episode of diverticulitis, self diagnosed. She has complaints of what she believes is a recent diverticulitis flare.  States she has had enough flares to where she knows what it is. She was given empiric abx by her PMD. Patient was recently hospitalized for bradycardia.  Hospitalist did contact Dr. Vergara regarding constipation and fecal impaction.  MR defecography was done with findings of redundant infolding of the anterior and posterior rectal mucosa during defecation with a intrarectal intussusception.  Small anterior rectocele during defecation.  Despite multiple attempts at defecation incomplete evacuation.  Small cystocele.  Diverticulosis.  The exam was otherwise normal.  Urogynecology consultation indicated.

## 2024-06-14 ENCOUNTER — CLAIMS CREATED FROM THE CLAIM WINDOW (OUTPATIENT)
Dept: URBAN - METROPOLITAN AREA CLINIC 4 | Facility: CLINIC | Age: 79
End: 2024-06-14
Payer: MEDICARE

## 2024-06-14 ENCOUNTER — OUT OF OFFICE VISIT (OUTPATIENT)
Dept: URBAN - METROPOLITAN AREA SURGERY CENTER 30 | Facility: SURGERY CENTER | Age: 79
End: 2024-06-14
Payer: MEDICARE

## 2024-06-14 DIAGNOSIS — D12.8 ADENOMATOUS POLYP OF RECTUM: ICD-10-CM

## 2024-06-14 DIAGNOSIS — K64.8 EXTERNAL HEMORRHOIDS: ICD-10-CM

## 2024-06-14 DIAGNOSIS — Z12.11 COLON CANCER SCREENING: ICD-10-CM

## 2024-06-14 DIAGNOSIS — D12.8 BENIGN NEOPLASM OF RECTUM: ICD-10-CM

## 2024-06-14 DIAGNOSIS — K62.1 ANAL AND RECTAL POLYP: ICD-10-CM

## 2024-06-14 DIAGNOSIS — K63.89 APPENDICITIS EPIPLOICA: ICD-10-CM

## 2024-06-14 DIAGNOSIS — F41.9 ACUTE ANXIETY: ICD-10-CM

## 2024-06-14 DIAGNOSIS — K63.89 OTHER SPECIFIED DISEASES OF INTESTINE: ICD-10-CM

## 2024-06-14 DIAGNOSIS — K57.30 ACQUIRED DIVERTICULOSIS OF COLON: ICD-10-CM

## 2024-06-14 DIAGNOSIS — D12.3 ADENOMA OF TRANSVERSE COLON: ICD-10-CM

## 2024-06-14 PROCEDURE — 45380 COLONOSCOPY AND BIOPSY: CPT | Performed by: INTERNAL MEDICINE

## 2024-06-14 PROCEDURE — 88305 TISSUE EXAM BY PATHOLOGIST: CPT | Performed by: PATHOLOGY

## 2024-06-14 PROCEDURE — 45385 COLONOSCOPY W/LESION REMOVAL: CPT | Performed by: INTERNAL MEDICINE

## 2024-06-14 PROCEDURE — 45381 COLONOSCOPY SUBMUCOUS NJX: CPT | Performed by: INTERNAL MEDICINE

## 2024-06-14 PROCEDURE — 00811 ANES LWR INTST NDSC NOS: CPT | Performed by: ANESTHESIOLOGY

## 2024-06-14 RX ORDER — AMITRIPTYLINE HYDROCHLORIDE 10 MG/1
1 TABLET AT BEDTIME TABLET, FILM COATED ORAL ONCE A DAY
Qty: 90 TABLET | Refills: 3 | Status: ACTIVE | COMMUNITY
Start: 2022-12-30

## 2024-06-14 RX ORDER — ONDANSETRON 4 MG/1
1 TABLET ON THE TONGUE AND ALLOW TO DISSOLVE TABLET, ORALLY DISINTEGRATING ORAL ONCE A DAY
Status: ACTIVE | COMMUNITY

## 2024-06-14 RX ORDER — DICYCLOMINE HYDROCHLORIDE 10 MG/1
2 CAPSULES CAPSULE ORAL THREE TIMES A DAY
Qty: 180 | Refills: 1 | Status: ACTIVE | COMMUNITY

## 2024-06-14 RX ORDER — FAMOTIDINE 40 MG/1
1 TABLET AT BEDTIME TABLET, FILM COATED ORAL ONCE A DAY
Qty: 90 TABLET | Refills: 3 | Status: ACTIVE | COMMUNITY
Start: 2023-10-10

## 2024-06-14 RX ORDER — LINACLOTIDE 290 UG/1
1 CAPSULE, GELATIN COATED ORAL ONCE A DAY
Qty: 90 | Refills: 3 | Status: ACTIVE | COMMUNITY
Start: 2024-03-18 | End: 2025-03-13

## 2024-06-14 RX ORDER — AMOXICILLIN 500 MG/1
1 CAPSULE CAPSULE ORAL
Status: ACTIVE | COMMUNITY

## 2024-06-14 RX ORDER — CEFDINIR 300 MG/1
AS DIRECTED CAPSULE ORAL
Status: ACTIVE | COMMUNITY

## 2024-06-14 RX ORDER — LOSARTAN POTASSIUM 25 MG/1
1 TABLET TABLET ORAL ONCE A DAY
Status: ACTIVE | COMMUNITY

## 2024-06-14 RX ORDER — PANTOPRAZOLE SODIUM 40 MG/1
1 TABLET TABLET, DELAYED RELEASE ORAL ONCE A DAY
Qty: 90 TABLET | Refills: 3 | Status: ACTIVE | COMMUNITY
Start: 2023-10-10

## 2024-06-14 RX ORDER — HYDROCHLOROTHIAZIDE 12.5 MG/1
1 TABLET IN THE MORNING TABLET ORAL ONCE A DAY
Status: ACTIVE | COMMUNITY

## 2024-06-14 RX ORDER — ONDANSETRON 4 MG/1
1 TABLET ON THE TONGUE AND ALLOW TO DISSOLVE TABLET, ORALLY DISINTEGRATING ORAL ONCE A DAY
Qty: 10 | Refills: 0 | Status: ACTIVE | COMMUNITY
Start: 2024-02-16

## 2024-06-17 ENCOUNTER — TELEPHONE ENCOUNTER (OUTPATIENT)
Dept: URBAN - METROPOLITAN AREA CLINIC 40 | Facility: CLINIC | Age: 79
End: 2024-06-17

## 2024-06-17 RX ORDER — ONDANSETRON 4 MG/1
1 TABLET AS NEEDED TABLET, ORALLY DISINTEGRATING ORAL EVERY 8 HOURS
Qty: 60 | Refills: 1

## 2024-06-19 ENCOUNTER — TELEPHONE ENCOUNTER (OUTPATIENT)
Dept: URBAN - METROPOLITAN AREA CLINIC 40 | Facility: CLINIC | Age: 79
End: 2024-06-19

## 2024-06-20 ENCOUNTER — TELEPHONE ENCOUNTER (OUTPATIENT)
Dept: URBAN - METROPOLITAN AREA CLINIC 40 | Facility: CLINIC | Age: 79
End: 2024-06-20

## 2024-07-03 ENCOUNTER — TELEPHONE ENCOUNTER (OUTPATIENT)
Dept: URBAN - METROPOLITAN AREA CLINIC 40 | Facility: CLINIC | Age: 79
End: 2024-07-03

## 2024-07-10 ENCOUNTER — OFFICE VISIT (OUTPATIENT)
Dept: URBAN - METROPOLITAN AREA CLINIC 40 | Facility: CLINIC | Age: 79
End: 2024-07-10

## 2024-08-16 ENCOUNTER — OFFICE VISIT (OUTPATIENT)
Dept: URBAN - METROPOLITAN AREA CLINIC 40 | Facility: CLINIC | Age: 79
End: 2024-08-16

## 2024-08-22 ENCOUNTER — TELEPHONE ENCOUNTER (OUTPATIENT)
Dept: URBAN - METROPOLITAN AREA CLINIC 40 | Facility: CLINIC | Age: 79
End: 2024-08-22

## 2024-08-26 ENCOUNTER — OFFICE VISIT (OUTPATIENT)
Dept: URBAN - METROPOLITAN AREA CLINIC 40 | Facility: CLINIC | Age: 79
End: 2024-08-26
Payer: COMMERCIAL

## 2024-08-26 VITALS
SYSTOLIC BLOOD PRESSURE: 126 MMHG | BODY MASS INDEX: 28.5 KG/M2 | HEIGHT: 67 IN | TEMPERATURE: 97.6 F | DIASTOLIC BLOOD PRESSURE: 70 MMHG | WEIGHT: 181.6 LBS | HEART RATE: 87 BPM

## 2024-08-26 DIAGNOSIS — Z90.49 HISTORY OF RIGHT HEMICOLECTOMY: ICD-10-CM

## 2024-08-26 DIAGNOSIS — K21.9 GERD WITHOUT ESOPHAGITIS: ICD-10-CM

## 2024-08-26 DIAGNOSIS — N81.6 RECTOCELE: ICD-10-CM

## 2024-08-26 DIAGNOSIS — Z85.038 HX OF COLON CANCER, STAGE I: ICD-10-CM

## 2024-08-26 PROBLEM — 266435005: Status: ACTIVE | Noted: 2024-08-26

## 2024-08-26 PROBLEM — 428305005: Status: ACTIVE | Noted: 2024-08-26

## 2024-08-26 PROBLEM — 429699009: Status: ACTIVE | Noted: 2024-08-26

## 2024-08-26 PROBLEM — 5964004: Status: ACTIVE | Noted: 2024-08-26

## 2024-08-26 PROCEDURE — 99214 OFFICE O/P EST MOD 30 MIN: CPT | Performed by: INTERNAL MEDICINE

## 2024-08-26 RX ORDER — LOSARTAN POTASSIUM 25 MG/1
1 TABLET TABLET ORAL ONCE A DAY
Status: ACTIVE | COMMUNITY

## 2024-08-26 RX ORDER — LINACLOTIDE 290 UG/1
1 CAPSULE, GELATIN COATED ORAL ONCE A DAY
Qty: 90 | Refills: 3 | Status: ON HOLD | COMMUNITY
Start: 2024-03-18 | End: 2025-03-13

## 2024-08-26 RX ORDER — ONDANSETRON 4 MG/1
1 TABLET ON THE TONGUE AND ALLOW TO DISSOLVE TABLET, ORALLY DISINTEGRATING ORAL ONCE A DAY
Qty: 10 | Refills: 0 | Status: ACTIVE | COMMUNITY
Start: 2024-02-16

## 2024-08-26 RX ORDER — DICYCLOMINE HYDROCHLORIDE 10 MG/1
2 CAPSULES CAPSULE ORAL THREE TIMES A DAY
Qty: 180 | Refills: 1 | Status: ON HOLD | COMMUNITY

## 2024-08-26 RX ORDER — AMITRIPTYLINE HYDROCHLORIDE 10 MG/1
1 TABLET AT BEDTIME TABLET, FILM COATED ORAL ONCE A DAY
Qty: 90 TABLET | Refills: 3 | Status: ON HOLD | COMMUNITY
Start: 2022-12-30

## 2024-08-26 RX ORDER — CEFDINIR 300 MG/1
AS DIRECTED CAPSULE ORAL
Status: ACTIVE | COMMUNITY

## 2024-08-26 RX ORDER — HYDROCHLOROTHIAZIDE 12.5 MG/1
1 TABLET IN THE MORNING TABLET ORAL ONCE A DAY
Status: ON HOLD | COMMUNITY

## 2024-08-26 RX ORDER — ONDANSETRON 4 MG/1
1 TABLET AS NEEDED TABLET, ORALLY DISINTEGRATING ORAL EVERY 8 HOURS
Qty: 60 | Refills: 1 | Status: ON HOLD | COMMUNITY

## 2024-08-26 RX ORDER — PANTOPRAZOLE SODIUM 40 MG/1
1 TABLET TABLET, DELAYED RELEASE ORAL TWICE A DAY
Qty: 180 TABLET | Refills: 3 | OUTPATIENT
Start: 2024-08-26

## 2024-08-26 RX ORDER — PANTOPRAZOLE SODIUM 40 MG/1
1 TABLET TABLET, DELAYED RELEASE ORAL ONCE A DAY
Qty: 90 TABLET | Refills: 3 | Status: ACTIVE | COMMUNITY
Start: 2023-10-10

## 2024-08-26 RX ORDER — AMOXICILLIN 500 MG/1
1 CAPSULE CAPSULE ORAL
Status: ON HOLD | COMMUNITY

## 2024-08-26 RX ORDER — FAMOTIDINE 40 MG/1
1 TABLET AT BEDTIME TABLET, FILM COATED ORAL ONCE A DAY
Qty: 90 TABLET | Refills: 3 | Status: ON HOLD | COMMUNITY
Start: 2023-10-10

## 2024-08-26 NOTE — HPI-TODAY'S VISIT:
--Patient presents for follow-up after colon surgery.  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  She underwent robotic right hemicolectomy with Dr. Quintero 6 weeks ago.  She had moderate postoperative complications requiring interventional radiology, eventually she was discharged and doing well in the office and follow-up with Dr. Quintero recently. 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  15 lymph nodes negative.  -- She is seeing GYN for PFD and rectocele.   ====== Prior Note: Mr. Coe is a 78 year old White female who returns to office for follow up. Last colonoscopy was years ago. Saw urology and a pelvic exam done and she was told her rectum was pushing on the vagina. She felt she had diverticulitis, but recent CT ruled this out.  Ongoing anxiety, multiple c/o, nausea, bloating, cramping, dyspepsia and nonspecific dysphagia. He suspected functional symptoms. Suspected. IBS-C. She d/c TCA/Amitriptyine. Xifaxan helped greatly (samples). UGI showed presbyesophagus, barium tablet stalled at the GEJ, stricture in the DDx, but none seen at EGD. Negative labs and CT abdomen pelvis without contrast. Negative EGD with esophageal, gastric, duodenal biopsies and dilation to 54Fr emperically for dysphagia. Low FODMAP diet, Xifaxan trial recommended. Last visit, patient was 100% better. She stopped Dicyclomine and uses Gaviscon only very rarely.  Patient had c/o recent episode of diverticulitis, self diagnosed. She has complaints of what she believes is a recent diverticulitis flare.  States she has had enough flares to where she knows what it is. She was given empiric abx by her PMD. Patient was recently hospitalized for bradycardia.  Hospitalist did contact Dr. Vergara regarding constipation and fecal impaction.  MR defecography was done with findings of redundant infolding of the anterior and posterior rectal mucosa during defecation with a intrarectal intussusception.  Small anterior rectocele during defecation.  Despite multiple attempts at defecation incomplete evacuation.  Small cystocele.  Diverticulosis.  The exam was otherwise normal.  Urogynecology consultation indicated. -- Now pt c/o GERD since surgery, Acid Reflux s/s daily, using OTC rx without relief.

## 2024-08-29 ENCOUNTER — TELEPHONE ENCOUNTER (OUTPATIENT)
Dept: URBAN - METROPOLITAN AREA CLINIC 40 | Facility: CLINIC | Age: 79
End: 2024-08-29

## 2024-08-29 RX ORDER — PANTOPRAZOLE SODIUM 40 MG/1
1 TABLET TABLET, DELAYED RELEASE ORAL TWICE A DAY
Qty: 180 TABLET | Refills: 3
Start: 2024-08-26

## 2024-09-17 ENCOUNTER — TELEPHONE ENCOUNTER (OUTPATIENT)
Dept: URBAN - METROPOLITAN AREA CLINIC 40 | Facility: CLINIC | Age: 79
End: 2024-09-17

## 2024-09-17 RX ORDER — CIPROFLOXACIN 500 MG/1
1 TABLET TABLET, FILM COATED ORAL
Qty: 14 TABLET | Refills: 0 | OUTPATIENT
Start: 2024-09-17 | End: 2024-09-24

## 2024-09-17 RX ORDER — METRONIDAZOLE 500 MG/1
1 TABLET TABLET ORAL THREE TIMES A DAY
Qty: 21 TABLET | Refills: 0 | OUTPATIENT
Start: 2024-09-17 | End: 2024-09-24

## 2024-09-18 ENCOUNTER — TELEPHONE ENCOUNTER (OUTPATIENT)
Dept: URBAN - METROPOLITAN AREA CLINIC 40 | Facility: CLINIC | Age: 79
End: 2024-09-18

## 2024-09-18 ENCOUNTER — OFFICE VISIT (OUTPATIENT)
Dept: URBAN - METROPOLITAN AREA CLINIC 74 | Facility: CLINIC | Age: 79
End: 2024-09-18

## 2024-09-30 ENCOUNTER — OFFICE VISIT (OUTPATIENT)
Dept: URBAN - METROPOLITAN AREA CLINIC 40 | Facility: CLINIC | Age: 79
End: 2024-09-30

## 2024-09-30 VITALS
TEMPERATURE: 97.9 F | BODY MASS INDEX: 28.94 KG/M2 | SYSTOLIC BLOOD PRESSURE: 138 MMHG | WEIGHT: 184.4 LBS | DIASTOLIC BLOOD PRESSURE: 70 MMHG | HEIGHT: 67 IN | HEART RATE: 70 BPM

## 2024-09-30 RX ORDER — ONDANSETRON 4 MG/1
1 TABLET ON THE TONGUE AND ALLOW TO DISSOLVE TABLET, ORALLY DISINTEGRATING ORAL ONCE A DAY
Qty: 10 | Refills: 0 | Status: ACTIVE | COMMUNITY
Start: 2024-02-16

## 2024-09-30 RX ORDER — PANTOPRAZOLE SODIUM 40 MG/1
1 TABLET TABLET, DELAYED RELEASE ORAL ONCE A DAY
Qty: 90 TABLET | Refills: 3 | Status: ACTIVE | COMMUNITY
Start: 2023-10-10

## 2024-09-30 RX ORDER — AMITRIPTYLINE HYDROCHLORIDE 10 MG/1
1 TABLET AT BEDTIME TABLET, FILM COATED ORAL ONCE A DAY
Qty: 90 TABLET | Refills: 3 | Status: DISCONTINUED | COMMUNITY
Start: 2022-12-30

## 2024-09-30 RX ORDER — PANTOPRAZOLE SODIUM 40 MG/1
1 TABLET TABLET, DELAYED RELEASE ORAL TWICE A DAY
Qty: 180 TABLET | Refills: 3 | Status: ACTIVE | COMMUNITY
Start: 2024-08-26

## 2024-09-30 RX ORDER — AMOXICILLIN 500 MG/1
1 CAPSULE CAPSULE ORAL
Status: DISCONTINUED | COMMUNITY

## 2024-09-30 RX ORDER — LOSARTAN POTASSIUM 25 MG/1
1 TABLET TABLET ORAL ONCE A DAY
Status: ACTIVE | COMMUNITY

## 2024-09-30 RX ORDER — CIPROFLOXACIN 500 MG/1
1 TABLET TABLET, FILM COATED ORAL
Qty: 14 TABLET | Refills: 0 | OUTPATIENT
Start: 2024-09-30 | End: 2024-10-07

## 2024-09-30 RX ORDER — ONDANSETRON 4 MG/1
1 TABLET AS NEEDED TABLET, ORALLY DISINTEGRATING ORAL EVERY 8 HOURS
Qty: 60 | Refills: 1 | Status: DISCONTINUED | COMMUNITY

## 2024-09-30 RX ORDER — FAMOTIDINE 40 MG/1
1 TABLET AT BEDTIME TABLET, FILM COATED ORAL ONCE A DAY
Qty: 90 TABLET | Refills: 3 | Status: DISCONTINUED | COMMUNITY
Start: 2023-10-10

## 2024-09-30 RX ORDER — LINACLOTIDE 290 UG/1
1 CAPSULE, GELATIN COATED ORAL ONCE A DAY
Qty: 90 | Refills: 3 | Status: DISCONTINUED | COMMUNITY
Start: 2024-03-18 | End: 2025-03-13

## 2024-09-30 RX ORDER — HYDROCHLOROTHIAZIDE 12.5 MG/1
1 TABLET IN THE MORNING TABLET ORAL ONCE A DAY
Status: DISCONTINUED | COMMUNITY

## 2024-09-30 RX ORDER — DICYCLOMINE HYDROCHLORIDE 10 MG/1
2 CAPSULES CAPSULE ORAL THREE TIMES A DAY
Qty: 180 | Refills: 1 | Status: DISCONTINUED | COMMUNITY

## 2024-09-30 RX ORDER — CEFDINIR 300 MG/1
AS DIRECTED CAPSULE ORAL
Status: ACTIVE | COMMUNITY

## 2024-11-11 ENCOUNTER — TELEPHONE ENCOUNTER (OUTPATIENT)
Dept: URBAN - METROPOLITAN AREA CLINIC 40 | Facility: CLINIC | Age: 79
End: 2024-11-11

## 2024-11-11 RX ORDER — CIPROFLOXACIN 500 MG/1
1 TABLET TABLET, FILM COATED ORAL
Qty: 14 TABLET | Refills: 0 | OUTPATIENT
Start: 2024-11-11 | End: 2024-11-18

## 2025-01-13 ENCOUNTER — OFFICE VISIT (OUTPATIENT)
Dept: URBAN - METROPOLITAN AREA CLINIC 40 | Facility: CLINIC | Age: 80
End: 2025-01-13

## 2025-02-10 ENCOUNTER — LAB OUTSIDE AN ENCOUNTER (OUTPATIENT)
Dept: URBAN - METROPOLITAN AREA CLINIC 40 | Facility: CLINIC | Age: 80
End: 2025-02-10

## 2025-02-10 ENCOUNTER — OFFICE VISIT (OUTPATIENT)
Dept: URBAN - METROPOLITAN AREA CLINIC 40 | Facility: CLINIC | Age: 80
End: 2025-02-10
Payer: COMMERCIAL

## 2025-02-10 VITALS
SYSTOLIC BLOOD PRESSURE: 142 MMHG | TEMPERATURE: 98 F | BODY MASS INDEX: 29.19 KG/M2 | HEIGHT: 67 IN | HEART RATE: 68 BPM | WEIGHT: 186 LBS | DIASTOLIC BLOOD PRESSURE: 74 MMHG

## 2025-02-10 DIAGNOSIS — Z85.038 HX OF COLON CANCER, STAGE I: ICD-10-CM

## 2025-02-10 DIAGNOSIS — Z90.49 HISTORY OF RIGHT HEMICOLECTOMY: ICD-10-CM

## 2025-02-10 DIAGNOSIS — N81.6 RECTOCELE: ICD-10-CM

## 2025-02-10 DIAGNOSIS — K21.9 GERD WITHOUT ESOPHAGITIS: ICD-10-CM

## 2025-02-10 DIAGNOSIS — R10.84 GENERALIZED ABDOMINAL PAIN: ICD-10-CM

## 2025-02-10 DIAGNOSIS — R39.198 DIFFICULTY URINATING: ICD-10-CM

## 2025-02-10 PROCEDURE — 99214 OFFICE O/P EST MOD 30 MIN: CPT | Performed by: INTERNAL MEDICINE

## 2025-02-10 RX ORDER — LOSARTAN POTASSIUM 25 MG/1
1 TABLET TABLET ORAL ONCE A DAY
Status: ACTIVE | COMMUNITY

## 2025-02-10 RX ORDER — CEFDINIR 300 MG/1
AS DIRECTED CAPSULE ORAL
Status: ACTIVE | COMMUNITY

## 2025-02-10 RX ORDER — PANTOPRAZOLE SODIUM 40 MG/1
1 TABLET TABLET, DELAYED RELEASE ORAL ONCE A DAY
Qty: 90 TABLET | Refills: 3 | Status: ACTIVE | COMMUNITY
Start: 2023-10-10

## 2025-02-10 RX ORDER — PANTOPRAZOLE SODIUM 40 MG/1
1 TABLET TABLET, DELAYED RELEASE ORAL TWICE A DAY
Qty: 180 TABLET | Refills: 3 | Status: ACTIVE | COMMUNITY
Start: 2024-08-26

## 2025-02-10 RX ORDER — ONDANSETRON 4 MG/1
1 TABLET ON THE TONGUE AND ALLOW TO DISSOLVE TABLET, ORALLY DISINTEGRATING ORAL ONCE A DAY
Qty: 10 | Refills: 0 | Status: ACTIVE | COMMUNITY
Start: 2024-02-16

## 2025-02-10 NOTE — HPI-TODAY'S VISIT:
--Follow up visit. --Seen for diverticulitis after prior colon surgery, Dr. Quintero managed s/s with Cipro/Flagyl and then pt saw us for similar c/o and Rx.  -GPP/CBC/CMP normal 12/2024. --CT abd/pelvis 11/2024, Sigmoid diverticulitis. 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 --Seeing Dr. Cintron in GYN. UI, conservative management. She recommended GI follow up due to pt c/o bloating and cramping abd pain. No a/a factors. Probiotic started. --Patient presents for follow-up after colon surgery.  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  She underwent robotic right hemicolectomy with Dr. Quintero.  She had moderate postoperative complications requiring interventional radiology, eventually she was discharged and doing well in the office and follow-up with Dr. Quintero recently. 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  15 lymph nodes negative.  -- She is seeing GYN for PFD and rectocele. -- She c/o mainly of bloating and cramping. Recent ABx with cipro/flagyl called in and s/s resolved. -- SHe c/o difficulty urinating. Has to take Lasix to pass urine. Dr. Cintron following.  ====== Prior Note: Mr. Coe is a 78 year old White female who returns to office for follow up. Last colonoscopy was years ago. Saw urology and a pelvic exam done and she was told her rectum was pushing on the vagina. She felt she had diverticulitis, but recent CT ruled this out.  Ongoing anxiety, multiple c/o, nausea, bloating, cramping, dyspepsia and nonspecific dysphagia. He suspected functional symptoms. Suspected. IBS-C. She d/c TCA/Amitriptyine. Xifaxan helped greatly (samples). UGI showed presbyesophagus, barium tablet stalled at the GEJ, stricture in the DDx, but none seen at EGD. Negative labs and CT abdomen pelvis without contrast. Negative EGD with esophageal, gastric, duodenal biopsies and dilation to 54Fr emperically for dysphagia. Low FODMAP diet, Xifaxan trial recommended. Last visit, patient was 100% better. She stopped Dicyclomine and uses Gaviscon only very rarely.  Patient had c/o recent episode of diverticulitis, self diagnosed. She has complaints of what she believes is a recent diverticulitis flare.  States she has had enough flares to where she knows what it is. She was given empiric abx by her PMD. Patient was recently hospitalized for bradycardia.  Hospitalist did contact Dr. Vergara regarding constipation and fecal impaction.  MR defecography was done with findings of redundant infolding of the anterior and posterior rectal mucosa during defecation with a intrarectal intussusception.  Small anterior rectocele during defecation.  Despite multiple attempts at defecation incomplete evacuation.  Small cystocele.  Diverticulosis.  The exam was otherwise normal.  Urogynecology consultation indicated. -- Now pt c/o GERD since surgery, Acid Reflux s/s daily, using OTC rx without relief.

## 2025-02-25 ENCOUNTER — P2P PATIENT RECORD (OUTPATIENT)
Age: 80
End: 2025-02-25

## 2025-03-05 ENCOUNTER — TELEPHONE ENCOUNTER (OUTPATIENT)
Dept: URBAN - METROPOLITAN AREA CLINIC 40 | Facility: CLINIC | Age: 80
End: 2025-03-05

## 2025-03-07 ENCOUNTER — OFFICE VISIT (OUTPATIENT)
Dept: URBAN - METROPOLITAN AREA SURGERY CENTER 30 | Facility: SURGERY CENTER | Age: 80
End: 2025-03-07
Payer: COMMERCIAL

## 2025-03-07 DIAGNOSIS — Z85.038 ADENOCARCINOMA, COLON, HX OF: ICD-10-CM

## 2025-03-07 DIAGNOSIS — K57.30 COLONIC DIVERTICULOSIS: ICD-10-CM

## 2025-03-07 DIAGNOSIS — Z98.0 INTESTINAL BYPASS OR ANASTOMOSIS STATUS: ICD-10-CM

## 2025-03-07 DIAGNOSIS — Z08 ENCNTR FOR FOLLOW-UP EXAM AFTER TRTMT FOR MALIGNANT NEOPLASM: ICD-10-CM

## 2025-03-07 DIAGNOSIS — Z85.038 HISTORY OF COLON CANCER: ICD-10-CM

## 2025-03-07 PROCEDURE — G0105 COLORECTAL SCRN; HI RISK IND: HCPCS | Performed by: INTERNAL MEDICINE

## 2025-03-07 PROCEDURE — 00811 ANES LWR INTST NDSC NOS: CPT | Performed by: NURSE ANESTHETIST, CERTIFIED REGISTERED

## 2025-03-07 RX ORDER — PANTOPRAZOLE SODIUM 40 MG/1
1 TABLET TABLET, DELAYED RELEASE ORAL TWICE A DAY
Qty: 180 TABLET | Refills: 3 | Status: ACTIVE | COMMUNITY
Start: 2024-08-26

## 2025-03-07 RX ORDER — LOSARTAN POTASSIUM 25 MG/1
1 TABLET TABLET ORAL ONCE A DAY
Status: ACTIVE | COMMUNITY

## 2025-03-07 RX ORDER — ONDANSETRON 4 MG/1
1 TABLET ON THE TONGUE AND ALLOW TO DISSOLVE TABLET, ORALLY DISINTEGRATING ORAL ONCE A DAY
Qty: 10 | Refills: 0 | Status: ACTIVE | COMMUNITY
Start: 2024-02-16

## 2025-03-07 RX ORDER — CEFDINIR 300 MG/1
AS DIRECTED CAPSULE ORAL
Status: ACTIVE | COMMUNITY

## 2025-03-07 RX ORDER — PANTOPRAZOLE SODIUM 40 MG/1
1 TABLET TABLET, DELAYED RELEASE ORAL ONCE A DAY
Qty: 90 TABLET | Refills: 3 | Status: ACTIVE | COMMUNITY
Start: 2023-10-10

## 2025-04-04 ENCOUNTER — OFFICE VISIT (OUTPATIENT)
Dept: URBAN - METROPOLITAN AREA CLINIC 40 | Facility: CLINIC | Age: 80
End: 2025-04-04